# Patient Record
Sex: MALE | Race: WHITE
[De-identification: names, ages, dates, MRNs, and addresses within clinical notes are randomized per-mention and may not be internally consistent; named-entity substitution may affect disease eponyms.]

---

## 2018-01-18 ENCOUNTER — HOSPITAL ENCOUNTER (EMERGENCY)
Dept: HOSPITAL 41 - JD.ED | Age: 53
Discharge: HOME | End: 2018-01-18
Payer: COMMERCIAL

## 2018-01-18 DIAGNOSIS — Z98.890: ICD-10-CM

## 2018-01-18 DIAGNOSIS — I10: ICD-10-CM

## 2018-01-18 DIAGNOSIS — G44.89: Primary | ICD-10-CM

## 2018-01-18 DIAGNOSIS — Z79.899: ICD-10-CM

## 2018-01-18 PROCEDURE — 85730 THROMBOPLASTIN TIME PARTIAL: CPT

## 2018-01-18 PROCEDURE — 96360 HYDRATION IV INFUSION INIT: CPT

## 2018-01-18 PROCEDURE — 86140 C-REACTIVE PROTEIN: CPT

## 2018-01-18 PROCEDURE — 81001 URINALYSIS AUTO W/SCOPE: CPT

## 2018-01-18 PROCEDURE — 85610 PROTHROMBIN TIME: CPT

## 2018-01-18 PROCEDURE — 80306 DRUG TEST PRSMV INSTRMNT: CPT

## 2018-01-18 PROCEDURE — 85025 COMPLETE CBC W/AUTO DIFF WBC: CPT

## 2018-01-18 PROCEDURE — 36415 COLL VENOUS BLD VENIPUNCTURE: CPT

## 2018-01-18 PROCEDURE — 96361 HYDRATE IV INFUSION ADD-ON: CPT

## 2018-01-18 PROCEDURE — 84484 ASSAY OF TROPONIN QUANT: CPT

## 2018-01-18 PROCEDURE — 70496 CT ANGIOGRAPHY HEAD: CPT

## 2018-01-18 PROCEDURE — 80053 COMPREHEN METABOLIC PANEL: CPT

## 2018-01-18 PROCEDURE — 93005 ELECTROCARDIOGRAM TRACING: CPT

## 2018-01-18 PROCEDURE — 99284 EMERGENCY DEPT VISIT MOD MDM: CPT

## 2018-01-18 NOTE — CT
CT angiogram

 

Technique: Multiple axial sections were obtained of the brain.  

Intravenous contrast was utilized.  Multiple MIP images were obtained.

 

Findings: No discrete aneurysm is seen on this exam.  MIP images shows

 areas of narrowing which are felt to be artifact.

 

Impression:

1.  No definite abnormality in CT angiogram of the brain.  Please note

 that MRI is more sensitive for aneurysm evaluation if clinically 

needed.

 

Diagnostic code #1

## 2018-01-18 NOTE — EDM.PDOC
ED HPI GENERAL MEDICAL PROBLEM





- General


Chief Complaint: Cardiovascular Problem


Stated Complaint: UNUSUAL BLOOD PRESSURE READINGS


Time Seen by Provider: 01/18/18 15:33


Source of Information: Reports: Patient


History Limitations: Reports: No Limitations





- History of Present Illness


INITIAL COMMENTS - FREE TEXT/NARRATIVE: 


Patient is a 53-year-old male with a history of hypertension and 

hypercholesteremia who presents to the ED complaining of headache to the 

posterior aspect of his head. Patient states he recently just returned from 

[place] while working for 6 days at high altitude at approximately  10,000 

feet. Patient states on the last day he abruptly experience severe headache to 

the posterior aspect of his head relieved with  rest. This is described as a 

throbbing sensation worse with laying down and improved with sitting up. He did 

take some Advil that alleviated some of the discomfort. Again the headache 

returned yesterday evening during sexual intercourse. Headache came on abruptly 

was quite severe in nature relieved with rest and relaxing. Patient states he 

did become mildly anxious during this and noticed during both occurrences of 

having some motor dysfunction with his upper extremities that subsided. States 

while utilizing a cordless drill he would drop it. He did check his blood 

pressure last night and found to be quite elevated. He is on lisinopril 40 mg 

and amlodipine 5 mg every day. There is a family history of coronary disease as 

well as sudden-death via brain aneurysm. Currently headache is described as a 4 

out of 10 localized to the posterior arch of his head. There is no vision 

changes, slurred speech, difficulty speaking, numbness or tingling/weakness to 

upper or lower extremities, difficult walking, nausea/vomiting, fever, stiff 

neck, back pain, chest pain, shortness of breath, or any additional complaints. 

He has been suffering from mild sinus congestion for the past year with no 

recent worsening symptoms. He's never had headache as such. He does not smoke 

although has a smoking history. Patient drinks approximately 2 pots of coffee a 

day. Denies any additional caffeine or energy drink use. Denies recreational 

drugs as well. Surgical history noncontributory. He has not taken Viagra Cialis.





  ** Headache


Pain Score (Numeric/FACES): 4





- Related Data


 Allergies











Allergy/AdvReac Type Severity Reaction Status Date / Time


 


No Known Allergies Allergy   Verified 01/18/18 15:14











Home Meds: 


 Home Meds





Fenofibrate 160 mg PO BEDTIME 01/18/18 [History]


Lisinopril 1 tab PO BEDTIME 01/18/18 [History]


amLODIPine [Norvasc] 5 mg PO BEDTIME 01/18/18 [History]











Past Medical History


HEENT History: Reports: Impaired Vision


Cardiovascular History: Reports: Hypertension


Musculoskeletal History: Reports: Fracture


Other Musculoskeletal History: broken back





- Past Surgical History


Musculoskeletal Surgical History: Reports: Shoulder Surgery


Other Musculoskeletal Surgeries/Procedures:: knee surgery





Social & Family History





- Family History


Family Medical History: Noncontributory





- Tobacco Use


Smoking Status *Q: Never Smoker





- Recreational Drug Use


Recreational Drug Use: No





ED ROS GENERAL





- Review of Systems


Review Of Systems: ROS reveals no pertinent complaints other than HPI.





ED EXAM, GENERAL





- Physical Exam


Exam: See Below


Exam Limited By: No Limitations


General Appearance: Alert, WD/WN, No Apparent Distress


Eye Exam: Bilateral Eye: EOMI, PERRL


Ears: Hearing Grossly Normal


Nose: Normal Inspection


Throat/Mouth: Normal Inspection, Normal Oropharynx, Normal Voice, No Airway 

Compromise


Head: Atraumatic, Normocephalic


Neck: Normal Inspection, Supple, Non-Tender, Full Range of Motion


Respiratory/Chest: No Respiratory Distress, Lungs Clear, Normal Breath Sounds, 

Chest Non-Tender


Cardiovascular: Normal Peripheral Pulses, Regular Rate, Rhythm, No Murmur


Peripheral Pulses: 4+: Radial (L), Radial (R)


GI/Abdominal: Normal Bowel Sounds, Soft, Non-Tender


Back Exam: Normal Inspection


Extremities: Normal Inspection, Normal Range of Motion, Non-Tender, No Pedal 

Edema, Normal Capillary Refill


Neurological: Alert, Oriented, CN II-XII Intact, Normal Cognition, No Motor/

Sensory Deficits, Other (Cerebellar function intact including: Finger-nose, 

rapid alternating movements. No weakness discrepancies to the upper and lower 

extremities. No facial droop. No slurred speech. No nystagmus present.)


Psychiatric: Normal Affect, Normal Mood


Skin Exam: Warm, Dry, Intact, Normal Color





Course





- Vital Signs


Last Recorded V/S: 


 Last Vital Signs











Temp  97.8 F   01/18/18 15:09


 


Pulse  66   01/18/18 15:09


 


Resp  18   01/18/18 15:09


 


BP  156/80 H  01/18/18 15:09


 


Pulse Ox  100   01/18/18 15:09














- Orders/Labs/Meds


Orders: 


 Active Orders 24 hr











 Category Date Time Status


 


 EKG Documentation Completion [RC] ASDIRECTED Care  01/18/18 15:29 Active


 


 Peripheral IV Care [RC] .AS DIRECTED Care  01/18/18 16:01 Active


 


 Peripheral IV Insertion Adult [OM.PC] Stat Oth  01/18/18 15:53 Ordered


 


 EKG 12 Lead [EK] Stat Ther  01/18/18 15:29 Ordered











Labs: 


 Laboratory Tests











  01/18/18 01/18/18 01/18/18 Range/Units





  16:30 16:30 16:30 


 


WBC  6.38    (4.23-9.07)  K/mm3


 


RBC  4.85    (4.63-6.08)  M/mm3


 


Hgb  14.8    (13.7-17.5)  gm/L


 


Hct  43.0    (40.1-51.0)  %


 


MCV  88.7    (79.0-92.2)  fl


 


MCH  30.5    (25.7-32.2)  pg


 


MCHC  34.4    (32.2-35.5)  g/dl


 


RDW Std Deviation  41.2    (35.1-43.9)  fL


 


Plt Count  277    (163-337)  K/mm3


 


MPV  9.8    (9.4-12.3)  fl


 


Neut % (Auto)  56.8    (34.0-67.9)  %


 


Lymph % (Auto)  34.3    (21.8-53.1)  %


 


Mono % (Auto)  6.6    (5.3-12.2)  %


 


Eos % (Auto)  2.0    (0.8-7.0)  


 


Baso % (Auto)  0.3    (0.1-1.2)  %


 


Neut # (Auto)  3.62    (1.78-5.38)  K/mm3


 


Lymph # (Auto)  2.19    (1.32-3.57)  K/mm3


 


Mono # (Auto)  0.42    (0.30-0.82)  K/mm3


 


Eos # (Auto)  0.13    (0.04-0.54)  K/mm3


 


Baso # (Auto)  0.02    (0.01-0.08)  K/mm3


 


PT   10.5   (8.0-13.0)  SECONDS


 


INR   0.97   


 


APTT     (22-36)  SECONDS


 


Sodium    141  (136-145)  mEq/L


 


Potassium    3.7  (3.5-5.1)  mEq/L


 


Chloride    108 H  ()  mEq/L


 


Carbon Dioxide    23  (21-32)  mEq/L


 


Anion Gap    13.7  (5-15)  


 


BUN    11  (7-18)  mg/dL


 


Creatinine    0.9  (0.7-1.3)  mg/dL


 


Est Cr Clr Drug Dosing    96.88  mL/min


 


Estimated GFR (MDRD)    > 60  (>60)  mL/min


 


BUN/Creatinine Ratio    12.2 L  (14-18)  


 


Glucose    116 H  ()  mg/dL


 


Calcium    8.9  (8.5-10.1)  mg/dL


 


Total Bilirubin    0.2  (0.2-1.0)  mg/dL


 


AST    18  (15-37)  U/L


 


ALT    38  (16-63)  U/L


 


Alkaline Phosphatase    44 L  ()  U/L


 


Troponin I    < 0.017  (0.00-0.056)  ng/mL


 


C-Reactive Protein    < 0.2  (<1.0)  mg/dL


 


Total Protein    6.6  (6.4-8.2)  g/dl


 


Albumin    3.8  (3.4-5.0)  g/dl


 


Globulin    2.8  gm/dL


 


Albumin/Globulin Ratio    1.4  (1-2)  


 


Urine Color     (Yellow)  


 


Urine Appearance     (Clear)  


 


Urine pH     (5.0-8.0)  


 


Ur Specific Gravity     (1.005-1.030)  


 


Urine Protein     (Negative)  


 


Urine Glucose (UA)     (Negative)  


 


Urine Ketones     (Negative)  


 


Urine Occult Blood     (Negative)  


 


Urine Nitrite     (Negative)  


 


Urine Bilirubin     (Negative)  


 


Urine Urobilinogen     (0.2-1.0)  


 


Ur Leukocyte Esterase     (Negative)  


 


Urine RBC     (0-5)  /hpf


 


Urine WBC     (0-5)  /hpf


 


Ur Epithelial Cells     (0-5)  /hpf


 


Urine Bacteria     (FEW)  /hpf


 


Urine Mucus     (FEW)  /hpf


 


Urine Opiates Screen     (NEGATIVE)  


 


Ur Buprenorphine Scrn     (NEGATIVE)  


 


Ur Oxycodone Screen     (NEGATIVE)  


 


Urine Methadone Screen     (NEGATIVE)  


 


Ur Propoxyphene Screen     (NEGATIVE)  


 


Ur Barbiturates Screen     (NEGATIVE)  


 


Ur Tricyclics Screen     (NEGATIVE)  


 


Ur Phencyclidine Scrn     (NEGATIVE)  


 


Ur Amphetamine Screen     (NEGATIVE)  


 


U Methamphetamines Scrn     (NEGATIVE)  


 


U Benzodiazepines Scrn     (NEGATIVE)  


 


U Cocaine Metab Screen     (NEGATIVE)  


 


U Marijuana (THC) Screen     (NEGATIVE)  














  01/18/18 01/18/18 01/18/18 Range/Units





  16:30 16:45 16:45 


 


WBC     (4.23-9.07)  K/mm3


 


RBC     (4.63-6.08)  M/mm3


 


Hgb     (13.7-17.5)  gm/L


 


Hct     (40.1-51.0)  %


 


MCV     (79.0-92.2)  fl


 


MCH     (25.7-32.2)  pg


 


MCHC     (32.2-35.5)  g/dl


 


RDW Std Deviation     (35.1-43.9)  fL


 


Plt Count     (163-337)  K/mm3


 


MPV     (9.4-12.3)  fl


 


Neut % (Auto)     (34.0-67.9)  %


 


Lymph % (Auto)     (21.8-53.1)  %


 


Mono % (Auto)     (5.3-12.2)  %


 


Eos % (Auto)     (0.8-7.0)  


 


Baso % (Auto)     (0.1-1.2)  %


 


Neut # (Auto)     (1.78-5.38)  K/mm3


 


Lymph # (Auto)     (1.32-3.57)  K/mm3


 


Mono # (Auto)     (0.30-0.82)  K/mm3


 


Eos # (Auto)     (0.04-0.54)  K/mm3


 


Baso # (Auto)     (0.01-0.08)  K/mm3


 


PT     (8.0-13.0)  SECONDS


 


INR     


 


APTT  24    (22-36)  SECONDS


 


Sodium     (136-145)  mEq/L


 


Potassium     (3.5-5.1)  mEq/L


 


Chloride     ()  mEq/L


 


Carbon Dioxide     (21-32)  mEq/L


 


Anion Gap     (5-15)  


 


BUN     (7-18)  mg/dL


 


Creatinine     (0.7-1.3)  mg/dL


 


Est Cr Clr Drug Dosing     mL/min


 


Estimated GFR (MDRD)     (>60)  mL/min


 


BUN/Creatinine Ratio     (14-18)  


 


Glucose     ()  mg/dL


 


Calcium     (8.5-10.1)  mg/dL


 


Total Bilirubin     (0.2-1.0)  mg/dL


 


AST     (15-37)  U/L


 


ALT     (16-63)  U/L


 


Alkaline Phosphatase     ()  U/L


 


Troponin I     (0.00-0.056)  ng/mL


 


C-Reactive Protein     (<1.0)  mg/dL


 


Total Protein     (6.4-8.2)  g/dl


 


Albumin     (3.4-5.0)  g/dl


 


Globulin     gm/dL


 


Albumin/Globulin Ratio     (1-2)  


 


Urine Color   Yellow   (Yellow)  


 


Urine Appearance   Clear   (Clear)  


 


Urine pH   6.5   (5.0-8.0)  


 


Ur Specific Gravity   1.020   (1.005-1.030)  


 


Urine Protein   Negative   (Negative)  


 


Urine Glucose (UA)   Negative   (Negative)  


 


Urine Ketones   Negative   (Negative)  


 


Urine Occult Blood   Negative   (Negative)  


 


Urine Nitrite   Negative   (Negative)  


 


Urine Bilirubin   Negative   (Negative)  


 


Urine Urobilinogen   0.2   (0.2-1.0)  


 


Ur Leukocyte Esterase   Negative   (Negative)  


 


Urine RBC   Not seen   (0-5)  /hpf


 


Urine WBC   0-5   (0-5)  /hpf


 


Ur Epithelial Cells   0-5   (0-5)  /hpf


 


Urine Bacteria   Not seen   (FEW)  /hpf


 


Urine Mucus   Not seen   (FEW)  /hpf


 


Urine Opiates Screen    Negative  (NEGATIVE)  


 


Ur Buprenorphine Scrn    Negative  (NEGATIVE)  


 


Ur Oxycodone Screen    Negative  (NEGATIVE)  


 


Urine Methadone Screen    Negative  (NEGATIVE)  


 


Ur Propoxyphene Screen    Negative  (NEGATIVE)  


 


Ur Barbiturates Screen    Negative  (NEGATIVE)  


 


Ur Tricyclics Screen    Negative  (NEGATIVE)  


 


Ur Phencyclidine Scrn    Negative  (NEGATIVE)  


 


Ur Amphetamine Screen    Negative  (NEGATIVE)  


 


U Methamphetamines Scrn    Negative  (NEGATIVE)  


 


U Benzodiazepines Scrn    Negative  (NEGATIVE)  


 


U Cocaine Metab Screen    Negative  (NEGATIVE)  


 


U Marijuana (THC) Screen    Presumptive positive H  (NEGATIVE)  











Meds: 


Medications














Discontinued Medications














Generic Name Dose Route Start Last Admin





  Trade Name Radha  PRN Reason Stop Dose Admin


 


Sodium Chloride  1,000 mls @ 125 mls/hr  01/18/18 16:15  01/18/18 16:48





  Normal Saline  IV   125 mls/hr





  ASDIRECTED BEATRICE   Administration


 


Sodium Chloride  100 mls @ 60 mls/hr  01/18/18 16:30  01/18/18 16:45





  Normal Saline  IV   60 mls/hr





  ASDIRECTED BEATRICE   Administration


 


Iopamidol  100 ml  01/18/18 16:19  01/18/18 16:45





  Isovue-370 (76%)  IVPUSH  01/18/18 16:20  100 ml





  ONETIME ONE   Administration


 


Sodium Chloride  10 ml  01/18/18 15:56  01/18/18 16:48





  Saline Flush  FLUSH   10 ml





  ASDIRECTED PRN   Administration





  Keep Vein Open   


 


Sodium Chloride  10 ml  01/18/18 16:19  01/18/18 16:45





  Saline Flush  FLUSH  01/18/18 16:20  10 ml





  ONETIME ONE   Administration














- Re-Assessments/Exams


Free Text/Narrative Re-Assessment/Exam: 





IV established with NS 125mls/hr. 





Initial labs and studies include CBC, chem 14, coag studies, troponin, UA, 

urine drug screen, CRP, EKG, and CTA head with without contrast.





EKG sinus rhythm at a rate of 62 with IA interval 159 and QTc 433. No acute ST 

changes noted.





CT head impression: No definite abnormality in CT angiogram of the brain. 

Please note that the MRI is more sensitive for aneurysm evaluation if 

clinically needed.





Labs reviewed: CBC, coag studies, chemistry panel, and UA were essentially 

normal. Urine drug tox positive for marijuana. Troponin less than 0.017. CRP 

less than 0.2.





Patient's blood pressures have been normotensive throughout his ED visit. At 

this point we'll not make any changes to his above medications. Headache is 

mild in nature currently. He has no additional symptoms. Will order an 

outpatient MRA of the brain to evaluate for aneurysms.  Patient was in 

agreement of plan. Discharge instructions as documented.








Departure





- Departure


Time of Disposition: 18:14


Disposition: Home, Self-Care 01


Condition: Good


Clinical Impression: 


Headache


Qualifiers:


 Headache type: other headache syndrome Qualified Code(s): G44.89 - Other 

headache syndrome





Instructions:  General Headache Without Cause, Easy-to-Read


Referrals: 


Lan Wall MD [Primary Care Provider] - 


Forms:  ED Department Discharge


Additional Instructions: 


As discussed CT and labs essentially normal with no acute findings noted. Will 

obtain MRA of the brain to rule out aneurysm and other potential causes of 

headache. Suggest refraining from any strenuous activities. Refrain from 

marijuana use and alcohol use. Utilize Tylenol and ibuprofen in alternating 

fashion for headache.  Return to the ED if you develop any new or worsening 

symptoms. They will contact you with MRA of the brain appointment. See PCP for 

results. Continue taking all your home medications as prescribed. Suggest 

checking your blood pressure daily at the same time same method everyday with 

log kept. Allow yourself approx 5-10 minutes of relaxation prior to taking. 

Bring the log and blood pressure machine with you to the clinic visit. See her 

PCP this coming week for reevaluation. Reduce caffeine intake.





- My Orders


Last 24 Hours: 


My Active Orders





01/18/18 15:29


EKG Documentation Completion [RC] ASDIRECTED 


EKG 12 Lead [EK] Stat 





01/18/18 15:53


Peripheral IV Insertion Adult [OM.PC] Stat 





01/18/18 16:01


Peripheral IV Care [RC] .AS DIRECTED 














- Assessment/Plan


Last 24 Hours: 


My Active Orders





01/18/18 15:29


EKG Documentation Completion [RC] ASDIRECTED 


EKG 12 Lead [EK] Stat 





01/18/18 15:53


Peripheral IV Insertion Adult [OM.PC] Stat 





01/18/18 16:01


Peripheral IV Care [RC] .AS DIRECTED